# Patient Record
Sex: FEMALE | Race: BLACK OR AFRICAN AMERICAN | NOT HISPANIC OR LATINO | URBAN - NONMETROPOLITAN AREA
[De-identification: names, ages, dates, MRNs, and addresses within clinical notes are randomized per-mention and may not be internally consistent; named-entity substitution may affect disease eponyms.]

---

## 2018-12-04 ENCOUNTER — APPOINTMENT (RX ONLY)
Dept: URBAN - NONMETROPOLITAN AREA CLINIC 27 | Facility: CLINIC | Age: 53
Setting detail: DERMATOLOGY
End: 2018-12-04

## 2018-12-04 PROBLEM — F32.9 MAJOR DEPRESSIVE DISORDER, SINGLE EPISODE, UNSPECIFIED: Status: ACTIVE | Noted: 2018-12-04

## 2018-12-04 PROBLEM — F41.9 ANXIETY DISORDER, UNSPECIFIED: Status: ACTIVE | Noted: 2018-12-04

## 2018-12-04 PROBLEM — D48.5 NEOPLASM OF UNCERTAIN BEHAVIOR OF SKIN: Status: ACTIVE | Noted: 2018-12-04

## 2018-12-04 PROBLEM — E13.9 OTHER SPECIFIED DIABETES MELLITUS WITHOUT COMPLICATIONS: Status: ACTIVE | Noted: 2018-12-04

## 2018-12-04 PROBLEM — D23.72 OTHER BENIGN NEOPLASM OF SKIN OF LEFT LOWER LIMB, INCLUDING HIP: Status: ACTIVE | Noted: 2018-12-04

## 2018-12-04 PROBLEM — K21.9 GASTRO-ESOPHAGEAL REFLUX DISEASE WITHOUT ESOPHAGITIS: Status: ACTIVE | Noted: 2018-12-04

## 2018-12-04 PROBLEM — L85.3 XEROSIS CUTIS: Status: ACTIVE | Noted: 2018-12-04

## 2018-12-04 PROBLEM — D23.61 OTHER BENIGN NEOPLASM OF SKIN OF RIGHT UPPER LIMB, INCLUDING SHOULDER: Status: ACTIVE | Noted: 2018-12-04

## 2018-12-04 PROCEDURE — ? BIOPSY BY SHAVE METHOD

## 2018-12-04 PROCEDURE — 99202 OFFICE O/P NEW SF 15 MIN: CPT | Mod: 25

## 2018-12-04 PROCEDURE — 11100: CPT

## 2018-12-04 PROCEDURE — ? COUNSELING

## 2018-12-04 NOTE — PROCEDURE: BIOPSY BY SHAVE METHOD
Anesthesia Type: 1% lidocaine with epinephrine
Hemostasis: Electrocautery
Post-Care Instructions: I reviewed with the patient in detail post-care instructions. Patient is to keep the biopsy site dry overnight, and then apply bacitracin twice daily until healed. Patient may apply hydrogen peroxide soaks to remove any crusting.
Destruction After The Procedure: No
Type Of Destruction Used: Curettage
Electrodesiccation Text: The wound bed was treated with electrodesiccation after the biopsy was performed.
Dressing: bandage
Notification Instructions: Patient will be notified of biopsy results. However, patient instructed to call the office if not contacted within 2 weeks.
Lab Facility: [id number]
Lab: [id number]
Billing Type: Patient Bill
Electrodesiccation And Curettage Text: The wound bed was treated with electrodesiccation and curettage after the biopsy was performed.
Cryotherapy Text: The wound bed was treated with cryotherapy after the biopsy was performed.
Biopsy Type: H and E
Wound Care: Bacitracin
Consent was obtained and risks were reviewed including but not limited to scarring, infection, bleeding, scabbing, incomplete removal, nerve damage and allergy to anesthesia.
Was A Bandage Applied: Yes
Size Of Lesion In Cm: 1
Detail Level: Detailed
Depth Of Biopsy: dermis
Additional Anesthesia Volume In Cc (Will Not Render If 0): 0
X Size Of Lesion In Cm: 0.8
Silver Nitrate Text: The wound bed was treated with silver nitrate after the biopsy was performed.
Anesthesia Volume In Cc (Will Not Render If 0): 0.5